# Patient Record
Sex: FEMALE | URBAN - METROPOLITAN AREA
[De-identification: names, ages, dates, MRNs, and addresses within clinical notes are randomized per-mention and may not be internally consistent; named-entity substitution may affect disease eponyms.]

---

## 2017-03-09 ENCOUNTER — APPOINTMENT (RX ONLY)
Dept: URBAN - METROPOLITAN AREA CLINIC 158 | Facility: CLINIC | Age: 27
Setting detail: DERMATOLOGY
End: 2017-03-09

## 2017-03-09 DIAGNOSIS — L70.0 ACNE VULGARIS: ICD-10-CM

## 2017-03-09 PROCEDURE — ? PRESCRIPTION

## 2017-03-09 PROCEDURE — 99213 OFFICE O/P EST LOW 20 MIN: CPT

## 2017-03-09 PROCEDURE — ? COUNSELING

## 2017-03-09 PROCEDURE — ? TREATMENT REGIMEN

## 2017-03-09 RX ORDER — SPIRONOLACTONE 50 MG/1
TABLET, FILM COATED ORAL BID
Qty: 180 | Refills: 4 | Status: ERX

## 2017-03-09 RX ORDER — TRETINOIN MICROSPHERES 0.1 %
GEL (GRAM) TOPICAL
Qty: 1 | Refills: 11 | Status: ERX | COMMUNITY
Start: 2017-03-09

## 2017-03-09 RX ADMIN — Medication: at 16:53

## 2017-03-09 ASSESSMENT — LOCATION SIMPLE DESCRIPTION DERM: LOCATION SIMPLE: LEFT CHEEK

## 2017-03-09 ASSESSMENT — LOCATION ZONE DERM: LOCATION ZONE: FACE

## 2017-03-09 ASSESSMENT — LOCATION DETAILED DESCRIPTION DERM: LOCATION DETAILED: LEFT INFERIOR CENTRAL MALAR CHEEK

## 2017-03-09 NOTE — PROCEDURE: TREATMENT REGIMEN
Discontinue Regimen: tretinoin d/t strength and drying
Initiate Treatment: retin a micro 0.1% cream QHS
Detail Level: Zone
Continue Regimen: spironolactone 50mg BID, ziana Michellem